# Patient Record
Sex: MALE | Race: WHITE | Employment: FULL TIME | URBAN - METROPOLITAN AREA
[De-identification: names, ages, dates, MRNs, and addresses within clinical notes are randomized per-mention and may not be internally consistent; named-entity substitution may affect disease eponyms.]

---

## 2019-03-21 ENCOUNTER — OFFICE VISIT (OUTPATIENT)
Dept: FAMILY MEDICINE CLINIC | Facility: CLINIC | Age: 39
End: 2019-03-21
Payer: COMMERCIAL

## 2019-03-21 ENCOUNTER — HOSPITAL ENCOUNTER (OUTPATIENT)
Dept: GENERAL RADIOLOGY | Age: 39
Discharge: HOME | End: 2019-03-21
Attending: FAMILY MEDICINE
Payer: COMMERCIAL

## 2019-03-21 VITALS
DIASTOLIC BLOOD PRESSURE: 88 MMHG | SYSTOLIC BLOOD PRESSURE: 142 MMHG | BODY MASS INDEX: 47.19 KG/M2 | WEIGHT: 315 LBS | HEIGHT: 68.5 IN | OXYGEN SATURATION: 97 % | HEART RATE: 80 BPM | RESPIRATION RATE: 18 BRPM | TEMPERATURE: 98 F

## 2019-03-21 DIAGNOSIS — F41.9 ANXIETY: ICD-10-CM

## 2019-03-21 DIAGNOSIS — M25.561 ACUTE PAIN OF RIGHT KNEE: ICD-10-CM

## 2019-03-21 DIAGNOSIS — M25.561 ACUTE PAIN OF RIGHT KNEE: Primary | ICD-10-CM

## 2019-03-21 DIAGNOSIS — R03.0 ELEVATED BLOOD PRESSURE READING WITHOUT DIAGNOSIS OF HYPERTENSION: ICD-10-CM

## 2019-03-21 PROCEDURE — 99204 OFFICE O/P NEW MOD 45 MIN: CPT | Performed by: FAMILY MEDICINE

## 2019-03-21 PROCEDURE — 73560 X-RAY EXAM OF KNEE 1 OR 2: CPT | Performed by: FAMILY MEDICINE

## 2019-03-21 RX ORDER — DULOXETIN HYDROCHLORIDE 30 MG/1
CAPSULE, DELAYED RELEASE ORAL
Refills: 3 | COMMUNITY
Start: 2019-02-22 | End: 2019-03-21

## 2019-03-21 RX ORDER — LORAZEPAM 0.5 MG/1
0.5 TABLET ORAL EVERY 6 HOURS PRN
Qty: 30 TABLET | Refills: 1 | Status: SHIPPED | OUTPATIENT
Start: 2019-03-21 | End: 2019-03-21

## 2019-03-21 RX ORDER — LORAZEPAM 0.5 MG/1
0.5 TABLET ORAL DAILY PRN
Qty: 30 TABLET | Refills: 1 | COMMUNITY
Start: 2019-03-21 | End: 2020-09-14

## 2019-03-21 RX ORDER — MELOXICAM 7.5 MG/1
7.5 TABLET ORAL 2 TIMES DAILY
Qty: 30 TABLET | Refills: 0 | Status: SHIPPED | OUTPATIENT
Start: 2019-03-21 | End: 2020-01-06 | Stop reason: ALTCHOICE

## 2019-03-21 NOTE — PATIENT INSTRUCTIONS
Recommend rest, ice pack and knee brace as needed   See orthopedics. Use lorazepam as needed for anxiety. See counselor. Will consider maintenance medication if anxiety gets worse.

## 2019-03-21 NOTE — PROGRESS NOTES
[facility]  PRO[facility]  Chief Complaint:   Patient presents with:  Knee Pain: R knee pain, started 8/2018  Anxiety      HPI:   This is a 41year old male presents to clinic for evaluation of her right knee pain and anxiety.   Patient h gain/loss, fever, chills, or fatigue. EYES:  Denies eye pain, visual loss, blurred vision, double vision or yellow sclerae. HEENT:  Denies hearing loss, sneezing, congestion, runny nose or sore throat.   INTEGUMENTARY:  Denies rashes, itching, skin lesi bruising, good turgor. LYMPHATIC: No cervical lymphadenopathy, no other lymphadenopathy. MUSCULOSKELETAL: Tenderness with palpation over. Lateral aspect of her right knee joint also have pain behind the right knee with palpation and flexion.   Limited ra List:     Ezekiel Wilde MD      This note was created utilizing Dragon speech recognition software.  Please excuse any grammatical errors. Call my office if you have any questions regarding this note.

## 2019-03-23 ENCOUNTER — TELEPHONE (OUTPATIENT)
Dept: FAMILY MEDICINE CLINIC | Facility: CLINIC | Age: 39
End: 2019-03-23

## 2019-03-23 NOTE — TELEPHONE ENCOUNTER
Patient is is calling stating that been weaning off of duloxetine and didn't take one of the pills last night and today he is having really bad anxiety. Patient took a duloxetine medication about a hour ago.  Patient is wondering if he could take a Tashi

## 2020-01-06 ENCOUNTER — OFFICE VISIT (OUTPATIENT)
Dept: FAMILY MEDICINE CLINIC | Facility: CLINIC | Age: 40
End: 2020-01-06
Payer: COMMERCIAL

## 2020-01-06 VITALS
HEIGHT: 68.5 IN | OXYGEN SATURATION: 98 % | HEART RATE: 89 BPM | BODY MASS INDEX: 47.19 KG/M2 | DIASTOLIC BLOOD PRESSURE: 78 MMHG | WEIGHT: 315 LBS | SYSTOLIC BLOOD PRESSURE: 130 MMHG | TEMPERATURE: 99 F

## 2020-01-06 DIAGNOSIS — M54.41 ACUTE MIDLINE LOW BACK PAIN WITH RIGHT-SIDED SCIATICA: Primary | ICD-10-CM

## 2020-01-06 PROBLEM — R10.13 EPIGASTRIC PAIN: Status: ACTIVE | Noted: 2017-03-17

## 2020-01-06 PROBLEM — G56.20 CUBITAL TUNNEL SYNDROME: Status: ACTIVE | Noted: 2017-03-17

## 2020-01-06 PROCEDURE — 99214 OFFICE O/P EST MOD 30 MIN: CPT | Performed by: NURSE PRACTITIONER

## 2020-01-06 RX ORDER — ALPRAZOLAM 0.25 MG/1
0.25 TABLET ORAL 3 TIMES DAILY PRN
COMMUNITY
Start: 2017-10-03

## 2020-01-06 RX ORDER — CYCLOBENZAPRINE HCL 10 MG
10 TABLET ORAL 3 TIMES DAILY PRN
COMMUNITY

## 2020-01-06 RX ORDER — METHYLPREDNISOLONE 4 MG/1
TABLET ORAL
Qty: 1 KIT | Refills: 0 | Status: SHIPPED | OUTPATIENT
Start: 2020-01-06

## 2020-01-06 RX ORDER — NAPROXEN 500 MG/1
500 TABLET ORAL 2 TIMES DAILY WITH MEALS
Qty: 30 TABLET | Refills: 1 | Status: SHIPPED | OUTPATIENT
Start: 2020-01-06 | End: 2020-09-14

## 2020-01-06 NOTE — PATIENT INSTRUCTIONS
Rest -  Supportive shoes,  Lumbar brace, Naprosyn twice a day with food, cyclobenzaprine three times a day as needed for muscle spasm, medrol pack  With food -  Ice and heat alternate.      Schedule a physical with Dr. Romie Valle

## 2020-01-06 NOTE — PROGRESS NOTES
Marcelina Hairston is a 46year old male.   Patient presents with:  Low Back Pain      HPI:   Complaints of Pain to lower back - hurts to bend   Wife put socks on for him   Started on Tuesday (12/31/19) - getting worse  Friday was at work - tried to get to Smokeless tobacco: Never Used    Alcohol use: No      Frequency: Never    Drug use: No    Family History   Problem Relation Age of Onset   • Heart Disease Mother    • Heart Disease Father    • Hypertension Father    • Pacemaker Father    • Other (lymphadem Prescriptions     Signed Prescriptions Disp Refills   • methylPREDNISolone (MEDROL) 4 MG Oral Tablet Therapy Pack 1 kit 0     Sig: As directed.    • naproxen 500 MG Oral Tab 30 tablet 1     Sig: Take 1 tablet (500 mg total) by mouth 2 (two) times daily with

## 2020-01-08 ENCOUNTER — TELEPHONE (OUTPATIENT)
Dept: FAMILY MEDICINE CLINIC | Facility: CLINIC | Age: 40
End: 2020-01-08

## 2020-01-08 NOTE — TELEPHONE ENCOUNTER
Recommend to schedule appt for evaluation and will consider tylenol #3 or tramadol for back pain.  Will not prescribe norco.

## 2020-01-08 NOTE — TELEPHONE ENCOUNTER
Patient states he continues to have a lot of back pain. Taking steroid/naproxyn/flexeril. Patient states above has not helped his pain. Patient is looking for some relief by way of  [place].  States he is not an addict.     Patient is not wanting to sp

## 2020-01-08 NOTE — TELEPHONE ENCOUNTER
Patient informed of below. Appt given with Earnestine Samuel tomorrow morning for further pain evaluation and extend note off for work. Patient informed will not receive Rx for [place] and informed of 's information below.   Kim Red, 01/08/20, 3:15 PM

## 2020-01-09 ENCOUNTER — HOSPITAL ENCOUNTER (OUTPATIENT)
Dept: GENERAL RADIOLOGY | Age: 40
Discharge: HOME | End: 2020-01-09
Attending: NURSE PRACTITIONER
Payer: COMMERCIAL

## 2020-01-09 ENCOUNTER — OFFICE VISIT (OUTPATIENT)
Dept: FAMILY MEDICINE CLINIC | Facility: CLINIC | Age: 40
End: 2020-01-09
Payer: COMMERCIAL

## 2020-01-09 VITALS
BODY MASS INDEX: 47.19 KG/M2 | WEIGHT: 315 LBS | SYSTOLIC BLOOD PRESSURE: 148 MMHG | HEIGHT: 68.5 IN | OXYGEN SATURATION: 98 % | DIASTOLIC BLOOD PRESSURE: 80 MMHG | HEART RATE: 92 BPM | TEMPERATURE: 98 F | RESPIRATION RATE: 18 BRPM

## 2020-01-09 DIAGNOSIS — M54.42 ACUTE MIDLINE LOW BACK PAIN WITH BILATERAL SCIATICA: Primary | ICD-10-CM

## 2020-01-09 DIAGNOSIS — M54.41 ACUTE MIDLINE LOW BACK PAIN WITH BILATERAL SCIATICA: ICD-10-CM

## 2020-01-09 DIAGNOSIS — M54.42 ACUTE MIDLINE LOW BACK PAIN WITH BILATERAL SCIATICA: ICD-10-CM

## 2020-01-09 DIAGNOSIS — M54.41 ACUTE MIDLINE LOW BACK PAIN WITH BILATERAL SCIATICA: Primary | ICD-10-CM

## 2020-01-09 PROCEDURE — 72110 X-RAY EXAM L-2 SPINE 4/>VWS: CPT | Performed by: NURSE PRACTITIONER

## 2020-01-09 PROCEDURE — 99214 OFFICE O/P EST MOD 30 MIN: CPT | Performed by: NURSE PRACTITIONER

## 2020-01-09 RX ORDER — ACETAMINOPHEN AND CODEINE PHOSPHATE 300; 30 MG/1; MG/1
1 TABLET ORAL EVERY 4 HOURS PRN
Qty: 30 TABLET | Refills: 0 | Status: SHIPPED | OUTPATIENT
Start: 2020-01-09 | End: 2020-01-24

## 2020-01-09 NOTE — PATIENT INSTRUCTIONS
Finish medrol dose pack     Take naprosyn twice a day with food     Tylenol with codeine for severe pain     Follow up with Dr. Betty Farias for pain

## 2020-01-09 NOTE — PROGRESS NOTES
Chu Polanco is a 46year old male. Patient presents with:  Pain: back pain seems worse for the past week      HPI:   Complaints that back pain has increased -   Pain lower right and middle left.    Feels like he can't straighten out his spine- feels complaints  SKIN: denies any unusual skin lesions or rashes  RESPIRATORY: denies shortness of breath with exertion, no cough  CARDIOVASCULAR: denies complaints   GI: denies abdominal pain, nausea, vomiting, diarrhea   MUSCULOSKELETAL:  See HPI   NEURO:see

## 2020-01-24 ENCOUNTER — TELEPHONE (OUTPATIENT)
Dept: FAMILY MEDICINE CLINIC | Facility: CLINIC | Age: 40
End: 2020-01-24

## 2020-01-24 RX ORDER — ACETAMINOPHEN AND CODEINE PHOSPHATE 300; 30 MG/1; MG/1
1 TABLET ORAL EVERY 4 HOURS PRN
Qty: 30 TABLET | Refills: 0 | Status: SHIPPED | OUTPATIENT
Start: 2020-01-24

## 2020-01-24 NOTE — TELEPHONE ENCOUNTER
In a lot of pain, can't wait until Adrianne Declan gets back in office, which is Monday. Please give him a call back. Looking for his refill on his Tylenol #3. I accidentally sent it to [facility].

## 2020-01-24 NOTE — TELEPHONE ENCOUNTER
PCP says Dr. Polo Grove. Patient has never seen Dr. Sydnie Benito before. Patient saw Jakob Novak for his pain on 1/6/2020 and 1/9/2020 in which tylenol #3 was prescribed. Patient is asking for a refill.      Dr. Polo Grove is out of the office today, Andria Barillas

## 2020-01-28 ENCOUNTER — TELEPHONE (OUTPATIENT)
Dept: FAMILY MEDICINE CLINIC | Facility: CLINIC | Age: 40
End: 2020-01-28

## 2020-01-28 NOTE — TELEPHONE ENCOUNTER
Patient states he is not sure if he will need the  Paperwork filled out or not. He will do some further checking with HR tomorrow. Will fax over paperwork if need be.   Analy Romero, 01/28/20, 3:25 PM

## 2020-09-14 ENCOUNTER — OFFICE VISIT (OUTPATIENT)
Dept: FAMILY MEDICINE CLINIC | Facility: CLINIC | Age: 40
End: 2020-09-14
Payer: COMMERCIAL

## 2020-09-14 VITALS
TEMPERATURE: 97 F | OXYGEN SATURATION: 98 % | HEART RATE: 86 BPM | DIASTOLIC BLOOD PRESSURE: 75 MMHG | BODY MASS INDEX: 46.65 KG/M2 | WEIGHT: 315 LBS | SYSTOLIC BLOOD PRESSURE: 130 MMHG | HEIGHT: 69 IN | RESPIRATION RATE: 22 BRPM

## 2020-09-14 DIAGNOSIS — G89.29 CHRONIC BILATERAL LOW BACK PAIN WITH BILATERAL SCIATICA: Primary | ICD-10-CM

## 2020-09-14 DIAGNOSIS — M54.41 CHRONIC BILATERAL LOW BACK PAIN WITH BILATERAL SCIATICA: Primary | ICD-10-CM

## 2020-09-14 DIAGNOSIS — M54.42 CHRONIC BILATERAL LOW BACK PAIN WITH BILATERAL SCIATICA: Primary | ICD-10-CM

## 2020-09-14 PROCEDURE — 3008F BODY MASS INDEX DOCD: CPT | Performed by: NURSE PRACTITIONER

## 2020-09-14 PROCEDURE — 99214 OFFICE O/P EST MOD 30 MIN: CPT | Performed by: NURSE PRACTITIONER

## 2020-09-14 PROCEDURE — 3075F SYST BP GE 130 - 139MM HG: CPT | Performed by: NURSE PRACTITIONER

## 2020-09-14 PROCEDURE — 3078F DIAST BP <80 MM HG: CPT | Performed by: NURSE PRACTITIONER

## 2020-09-14 RX ORDER — CYCLOBENZAPRINE HCL 10 MG
10 TABLET ORAL NIGHTLY PRN
Qty: 30 TABLET | Refills: 1 | Status: SHIPPED | OUTPATIENT
Start: 2020-09-14 | End: 2020-10-04

## 2020-09-14 RX ORDER — METHYLPREDNISOLONE 4 MG/1
TABLET ORAL
Qty: 1 KIT | Refills: 0 | Status: SHIPPED | OUTPATIENT
Start: 2020-09-14

## 2020-09-14 RX ORDER — ACETAMINOPHEN AND CODEINE PHOSPHATE 300; 30 MG/1; MG/1
1 TABLET ORAL EVERY 4 HOURS PRN
Qty: 30 TABLET | Refills: 0 | Status: SHIPPED | OUTPATIENT
Start: 2020-09-14 | End: 2020-09-28

## 2020-09-14 RX ORDER — FLUTICASONE PROPIONATE 50 MCG
1 SPRAY, SUSPENSION (ML) NASAL DAILY
COMMUNITY
Start: 2020-08-25

## 2020-09-14 NOTE — PATIENT INSTRUCTIONS
Rest,  Ice and heat to back,     Wear supportive shoes     Take Aleve 2 pills every 12 hrs with food-     Medrol pack - as directed with food     Muscle relaxant at bed time (cyclobenzaprine)     Tylenol with codeine for severe pain every 4 hrs with food-

## 2020-09-14 NOTE — PROGRESS NOTES
Brooke Parent is a 46year old male.   Patient presents with:  Low Back Pain      HPI:   Complaints of back pain continues- has \"tweek\" back - has been through physical therapy   First happened 12/31  This am - had pain with putting on shoes and hear daily as needed. • methylPREDNISolone (MEDROL) 4 MG Oral Tablet Therapy Pack As directed. (Patient not taking: Reported on 9/14/2020 ) 1 kit 0      History reviewed. No pertinent past medical history.    Social History:  Social History    Tobacco Use Visit:  Requested Prescriptions     Signed Prescriptions Disp Refills   • methylPREDNISolone (MEDROL) 4 MG Oral Tablet Therapy Pack 1 kit 0     Sig: As directed.    • cyclobenzaprine 10 MG Oral Tab 30 tablet 1     Sig: Take 1 tablet (10 mg total) by mouth n

## 2020-09-17 ENCOUNTER — TELEPHONE (OUTPATIENT)
Dept: FAMILY MEDICINE CLINIC | Facility: CLINIC | Age: 40
End: 2020-09-17

## 2020-09-17 NOTE — TELEPHONE ENCOUNTER
No future appointments. Back pain is worse and called into work today. Is wondering if can get a note to be off work until next appt.

## 2020-09-17 NOTE — TELEPHONE ENCOUNTER
Pt notified and verbalized understanding. He asked that we fax note to his work : Attn: Raegan Piedra, fax # [phone]. Note faxed and pt also signing up for my chart.

## 2020-09-17 NOTE — TELEPHONE ENCOUNTER
Pt said his back is in excruciating pain today, much worse then Monday when he was seen. He got into his car today and had to call off work because of how much pain this caused his back. Pt says he can barely bend or walk.  Says it will be hard for him to g

## 2020-09-26 ENCOUNTER — TELEPHONE (OUTPATIENT)
Dept: FAMILY MEDICINE CLINIC | Facility: CLINIC | Age: 40
End: 2020-09-26

## 2020-09-26 NOTE — TELEPHONE ENCOUNTER
[facility] sent a request for a copy of pt's recent lumbar spine xray report.  I sent a copy via electronic  fax  to [phone]

## 2020-09-28 ENCOUNTER — TELEPHONE (OUTPATIENT)
Dept: FAMILY MEDICINE CLINIC | Facility: CLINIC | Age: 40
End: 2020-09-28

## 2020-09-28 RX ORDER — ACETAMINOPHEN AND CODEINE PHOSPHATE 300; 30 MG/1; MG/1
1 TABLET ORAL EVERY 4 HOURS PRN
Qty: 30 TABLET | Refills: 0 | Status: SHIPPED | OUTPATIENT
Start: 2020-09-28

## 2020-09-28 NOTE — TELEPHONE ENCOUNTER
RF Tylenol #3    to   [facility] in [place]  [address]          call to confirm / leave mess ok       No future appointments.

## 2020-09-28 NOTE — TELEPHONE ENCOUNTER
Refill sent–no more refills for Tylenol with codeine will be sent until patient follows up with physician.   Please have patient make a follow-up appointment with his MD.  Patient should be using Tylenol with codeine sparingly

## 2021-04-09 DIAGNOSIS — Z23 NEED FOR VACCINATION: ICD-10-CM

## (undated) NOTE — LETTER
Date: 1/9/2020    Patient Name: Maxine Terrazas          To Whom it may concern: This letter has been written at the patient's request. The above patient was seen at the [facility] for treatment of a medical condition.     This patient s

## (undated) NOTE — LETTER
09/17/20    To whom it may concern,    This patient is under our care for ongoing back pain. Please excuse him for work missed. Patient will be seeing specialist on Wednesday, September 23, 2020. He will not be able to attend work due to severe pain.

## (undated) NOTE — LETTER
Date: 1/6/2020    Patient Name: Jose Orlando          To Whom it may concern: This letter has been written at the patient's request. The above patient was seen at the [facility] for treatment of a medical condition.     This patient s